# Patient Record
Sex: FEMALE | Race: WHITE | NOT HISPANIC OR LATINO
[De-identification: names, ages, dates, MRNs, and addresses within clinical notes are randomized per-mention and may not be internally consistent; named-entity substitution may affect disease eponyms.]

---

## 2021-03-16 PROBLEM — Z00.00 ENCOUNTER FOR PREVENTIVE HEALTH EXAMINATION: Status: ACTIVE | Noted: 2021-03-16

## 2021-03-24 DIAGNOSIS — Z86.59 PERSONAL HISTORY OF OTHER MENTAL AND BEHAVIORAL DISORDERS: ICD-10-CM

## 2021-03-24 DIAGNOSIS — N61.1 ABSCESS OF THE BREAST AND NIPPLE: ICD-10-CM

## 2021-03-24 DIAGNOSIS — Z86.39 PERSONAL HISTORY OF OTHER ENDOCRINE, NUTRITIONAL AND METABOLIC DISEASE: ICD-10-CM

## 2021-03-24 DIAGNOSIS — Z78.9 OTHER SPECIFIED HEALTH STATUS: ICD-10-CM

## 2021-03-24 DIAGNOSIS — Z87.19 PERSONAL HISTORY OF OTHER DISEASES OF THE DIGESTIVE SYSTEM: ICD-10-CM

## 2021-03-24 DIAGNOSIS — Z87.891 PERSONAL HISTORY OF NICOTINE DEPENDENCE: ICD-10-CM

## 2021-03-24 DIAGNOSIS — Z87.898 PERSONAL HISTORY OF OTHER SPECIFIED CONDITIONS: ICD-10-CM

## 2021-03-24 RX ORDER — ATORVASTATIN CALCIUM 20 MG/1
20 TABLET, FILM COATED ORAL
Refills: 0 | Status: ACTIVE | COMMUNITY

## 2021-03-24 RX ORDER — LEVOTHYROXINE SODIUM 0.09 MG/1
88 TABLET ORAL
Refills: 0 | Status: ACTIVE | COMMUNITY

## 2021-03-26 ENCOUNTER — APPOINTMENT (OUTPATIENT)
Dept: BREAST CENTER | Facility: CLINIC | Age: 71
End: 2021-03-26

## 2021-03-29 ENCOUNTER — APPOINTMENT (OUTPATIENT)
Dept: BREAST CENTER | Facility: CLINIC | Age: 71
End: 2021-03-29

## 2021-03-31 DIAGNOSIS — Z80.0 FAMILY HISTORY OF MALIGNANT NEOPLASM OF DIGESTIVE ORGANS: ICD-10-CM

## 2021-04-01 ENCOUNTER — APPOINTMENT (OUTPATIENT)
Dept: BREAST CENTER | Facility: CLINIC | Age: 71
End: 2021-04-01

## 2021-04-01 ENCOUNTER — APPOINTMENT (OUTPATIENT)
Dept: BREAST CENTER | Facility: CLINIC | Age: 71
End: 2021-04-01
Payer: MEDICARE

## 2021-04-01 VITALS
HEIGHT: 65 IN | WEIGHT: 150 LBS | BODY MASS INDEX: 24.99 KG/M2 | SYSTOLIC BLOOD PRESSURE: 120 MMHG | DIASTOLIC BLOOD PRESSURE: 71 MMHG

## 2021-04-01 DIAGNOSIS — Z80.3 FAMILY HISTORY OF MALIGNANT NEOPLASM OF BREAST: ICD-10-CM

## 2021-04-01 PROCEDURE — 99213 OFFICE O/P EST LOW 20 MIN: CPT

## 2021-04-01 RX ORDER — ESCITALOPRAM OXALATE 10 MG/1
10 TABLET, FILM COATED ORAL
Refills: 0 | Status: ACTIVE | COMMUNITY

## 2021-04-01 RX ORDER — ESCITALOPRAM OXALATE 10 MG/1
10 TABLET ORAL
Refills: 0 | Status: DISCONTINUED | COMMUNITY
End: 2021-04-01

## 2021-04-01 RX ORDER — ZOLPIDEM TARTRATE 10 MG/1
10 TABLET, FILM COATED ORAL
Refills: 0 | Status: ACTIVE | COMMUNITY

## 2021-04-01 RX ORDER — PANTOPRAZOLE 40 MG/1
40 TABLET, DELAYED RELEASE ORAL
Refills: 0 | Status: DISCONTINUED | COMMUNITY
End: 2021-04-01

## 2021-04-01 NOTE — PHYSICAL EXAM
[No Supraclavicular Adenopathy] : no supraclavicular adenopathy [No Cervical Adenopathy] : no cervical adenopathy [Examined in the supine and seated position] : examined in the supine and seated position [Symmetrical] : symmetrical [No dominant masses] : no dominant masses in right breast  [No dominant masses] : no dominant masses left breast [No Nipple Retraction] : no left nipple retraction [No Nipple Discharge] : no left nipple discharge [No Rashes] : no rashes [No Axillary Lymphadenopathy] : no left axillary lymphadenopathy

## 2021-04-01 NOTE — HISTORY OF PRESENT ILLNESS
[FreeTextEntry1] : 70-year-old postmenopausal woman with a strong family history of breast cancer presents for routine follow-up as a high risk patient.  Patient denies any breast masses or nipple discharge.  Patient's mother had breast cancer at age 58 and her sister was diagnosed at age 53.  Dafne model shows a 5.1% 5-year and 13.7% lifetime risk of breast cancer.  Patient has never had a breast biopsy before and is never taken hormone replacement therapy.  Patient has osteopenia and is currently scheduled for a bone density test in June.  She is also due for her mammogram and ultrasound in June.  Many years ago she had genetic testing and was BRCA negative.

## 2021-04-15 ENCOUNTER — TRANSCRIPTION ENCOUNTER (OUTPATIENT)
Age: 71
End: 2021-04-15

## 2022-04-08 NOTE — ASSESSMENT
[FreeTextEntry1] : The patient is a 71-year-old G1, P1 postmenopausal white female.  She underwent menopause in  at age 49 and never took any hormone replacement therapy.  She has never had a breast biopsy before and is never taken hormone replacement therapy.  She has a strong family history of breast cancer with her mother who developed breast cancer at age 58 and later  of metastatic disease.  Her sister was diagnosed with breast cancer and developed metastatic disease to the liver.  She has a maternal aunt with stomach cancer and another great aunt who had breast cancer at a young age.  The patient did undergo genetic testing in the past and was BRCA negative.  Dafne model shows a 5.1% 5-year and 13.7% lifetime risk of breast cancer. She has osteopenia.  The patient underwent her last bilateral mammography and ultrasound which was reviewed from [facility] performed on 2021 which showed no suspicious findings.  On exam today, ...... the patient was reassured and should continue yearly follow-up again in 2023.  Her next bilateral mammography and ultrasound will be due in 2022 and she was given prescriptions. ?????  Did she ever get genetic panel testing ??????

## 2022-04-08 NOTE — PHYSICAL EXAM
[No Supraclavicular Adenopathy] : no supraclavicular adenopathy [No Cervical Adenopathy] : no cervical adenopathy [Examined in the supine and seated position] : examined in the supine and seated position [Symmetrical] : symmetrical [No dominant masses] : no dominant masses in right breast  [No dominant masses] : no dominant masses left breast [No Nipple Retraction] : no left nipple retraction [No Nipple Discharge] : no left nipple discharge [No Axillary Lymphadenopathy] : no left axillary lymphadenopathy [No Rashes] : no rashes [Normocephalic] : normocephalic [Atraumatic] : atraumatic [EOMI] : extra ocular movement intact [Supple] : supple [Breast Mass Right Breast ___cm] : no masses [Breast Mass Left Breast ___cm] : no masses [Breast Nipple Inversion] : nipples not inverted [Breast Nipple Retraction] : nipples not retracted [Breast Nipple Flattening] : nipples not flattened [Breast Nipple Fissures] : nipples not fissured [Breast Abnormal Lactation (Galactorrhea)] : no galactorrhea [Breast Abnormal Secretion Bloody Fluid] : no bloody discharge [Breast Abnormal Secretion Serous Fluid] : no serous discharge [Breast Abnormal Secretion Opalescent Fluid] : no milky discharge [No Edema] : no edema [No Ulceration] : no ulceration [de-identified] : On exam, the patient has ptotic C-cup breasts.  On palpation, I cannot feel any suspicious densities in either breast.  She has no axillary, supraclavicular, or cervical adenopathy.

## 2022-04-08 NOTE — PAST MEDICAL HISTORY
[Postmenopausal] : The patient is postmenopausal [Menarche Age ____] : age at menarche was [unfilled] [Total Preg ___] : G[unfilled] [Live Births ___] : P[unfilled]  [Age At Live Birth ___] : Age at live birth: [unfilled] [Menopause Age____] : age at menopause was [unfilled] [History of Hormone Replacement Treatment] : has no history of hormone replacement treatment

## 2022-04-08 NOTE — HISTORY OF PRESENT ILLNESS
[FreeTextEntry1] : The patient is a 71-year-old G1, P1 postmenopausal white female.  She underwent menopause in  at age 49 and never took any hormone replacement therapy.  She has never had a breast biopsy before and is never taken hormone replacement therapy.  She has a strong family history of breast cancer with her mother who developed breast cancer at age 58 and later  of metastatic disease.  Her sister was diagnosed with breast cancer and developed metastatic disease to the liver.  She has a maternal aunt with stomach cancer and another great aunt who had breast cancer at a young age.  The patient did undergo genetic testing in the past and was BRCA negative.  Dafne model shows a 5.1% 5-year and 13.7% lifetime risk of breast cancer. She has osteopenia.  She comes in for routine follow-up and continues to get yearly mammography and ultrasound.

## 2022-04-08 NOTE — REASON FOR VISIT
[Follow-Up: _____] : a [unfilled] follow-up visit [FreeTextEntry1] : The patient comes in with a strong family history of breast cancer for yearly clinical exam and gets yearly mammography and ultrasound.

## 2022-04-11 ENCOUNTER — APPOINTMENT (OUTPATIENT)
Dept: BREAST CENTER | Facility: CLINIC | Age: 72
End: 2022-04-11

## 2022-04-13 ENCOUNTER — APPOINTMENT (OUTPATIENT)
Dept: BREAST CENTER | Facility: CLINIC | Age: 72
End: 2022-04-13
Payer: MEDICARE

## 2022-04-26 ENCOUNTER — APPOINTMENT (OUTPATIENT)
Dept: BREAST CENTER | Facility: CLINIC | Age: 72
End: 2022-04-26
Payer: MEDICARE

## 2022-04-26 VITALS
WEIGHT: 150 LBS | HEART RATE: 53 BPM | BODY MASS INDEX: 24.96 KG/M2 | SYSTOLIC BLOOD PRESSURE: 124 MMHG | DIASTOLIC BLOOD PRESSURE: 69 MMHG | OXYGEN SATURATION: 98 %

## 2022-04-26 PROCEDURE — 99213 OFFICE O/P EST LOW 20 MIN: CPT

## 2022-04-26 NOTE — ASSESSMENT
[FreeTextEntry1] : The patient is a 71-year-old G1, P1 postmenopausal white female.  She underwent menopause in  at age 49 and never took any hormone replacement therapy.  She has never had a breast biopsy before and is never taken hormone replacement therapy.  She has a strong family history of breast cancer with her mother who developed breast cancer at age 58 and later  of metastatic disease.  Her sister was diagnosed with breast cancer and developed metastatic disease to the liver.  She has a maternal aunt with stomach cancer and another great aunt who had breast cancer at a young age.  The patient did undergo genetic testing in the past and was BRCA negative.  Dafne model shows a 5.1% 5-year and 13.7% lifetime risk of breast cancer. She has osteopenia.  The patient underwent her last bilateral mammography and ultrasound which was reviewed from [facility] performed on  2021 which showed no suspicious findings.  On exam today, I cannot feel any suspicious findings in either breast.  The patient was reassured and should continue yearly follow-up again in 2023.  Her next bilateral mammography and ultrasound will be due in 2022 and she was given prescriptions.  She does not want to move forward with genetic panel testing at this time.

## 2022-04-26 NOTE — PHYSICAL EXAM
[Normocephalic] : normocephalic [Atraumatic] : atraumatic [EOMI] : extra ocular movement intact [Supple] : supple [No Supraclavicular Adenopathy] : no supraclavicular adenopathy [No Cervical Adenopathy] : no cervical adenopathy [Symmetrical] : symmetrical [Examined in the supine and seated position] : examined in the supine and seated position [No dominant masses] : no dominant masses in right breast  [No dominant masses] : no dominant masses left breast [No Nipple Retraction] : no left nipple retraction [No Nipple Discharge] : no left nipple discharge [Breast Mass Right Breast ___cm] : no masses [Breast Mass Left Breast ___cm] : no masses [No Axillary Lymphadenopathy] : no left axillary lymphadenopathy [No Edema] : no edema [No Rashes] : no rashes [No Ulceration] : no ulceration [Breast Nipple Inversion] : nipples not inverted [Breast Nipple Retraction] : nipples not retracted [Breast Nipple Flattening] : nipples not flattened [Breast Nipple Fissures] : nipples not fissured [Breast Abnormal Lactation (Galactorrhea)] : no galactorrhea [Breast Abnormal Secretion Bloody Fluid] : no bloody discharge [Breast Abnormal Secretion Serous Fluid] : no serous discharge [Breast Abnormal Secretion Opalescent Fluid] : no milky discharge [de-identified] : On exam, the patient has ptotic C-cup breasts.  On palpation, I cannot feel any suspicious densities in either breast.  She has no axillary, supraclavicular, or cervical adenopathy.

## 2022-07-14 ENCOUNTER — RESULT REVIEW (OUTPATIENT)
Age: 72
End: 2022-07-14

## 2023-08-17 ENCOUNTER — APPOINTMENT (OUTPATIENT)
Dept: BREAST CENTER | Facility: CLINIC | Age: 73
End: 2023-08-17
Payer: MEDICARE

## 2023-08-17 VITALS
HEIGHT: 65 IN | OXYGEN SATURATION: 97 % | BODY MASS INDEX: 24.99 KG/M2 | WEIGHT: 150 LBS | HEART RATE: 54 BPM | DIASTOLIC BLOOD PRESSURE: 70 MMHG | SYSTOLIC BLOOD PRESSURE: 112 MMHG

## 2023-08-17 DIAGNOSIS — Z80.3 FAMILY HISTORY OF MALIGNANT NEOPLASM OF BREAST: ICD-10-CM

## 2023-08-17 DIAGNOSIS — N60.11 DIFFUSE CYSTIC MASTOPATHY OF LEFT BREAST: ICD-10-CM

## 2023-08-17 DIAGNOSIS — N60.12 DIFFUSE CYSTIC MASTOPATHY OF LEFT BREAST: ICD-10-CM

## 2023-08-17 DIAGNOSIS — R92.2 INCONCLUSIVE MAMMOGRAM: ICD-10-CM

## 2023-08-17 DIAGNOSIS — Z12.39 ENCOUNTER FOR OTHER SCREENING FOR MALIGNANT NEOPLASM OF BREAST: ICD-10-CM

## 2023-08-17 PROCEDURE — 99213 OFFICE O/P EST LOW 20 MIN: CPT

## 2023-08-17 NOTE — PHYSICAL EXAM
[Normocephalic] : normocephalic [Atraumatic] : atraumatic [EOMI] : extra ocular movement intact [Supple] : supple [No Supraclavicular Adenopathy] : no supraclavicular adenopathy [No Cervical Adenopathy] : no cervical adenopathy [Examined in the supine and seated position] : examined in the supine and seated position [Symmetrical] : symmetrical [No dominant masses] : no dominant masses in right breast  [No dominant masses] : no dominant masses left breast [No Nipple Retraction] : no left nipple retraction [No Nipple Discharge] : no left nipple discharge [Breast Mass Right Breast ___cm] : no masses [Breast Mass Left Breast ___cm] : no masses [No Axillary Lymphadenopathy] : no left axillary lymphadenopathy [No Edema] : no edema [No Rashes] : no rashes [No Ulceration] : no ulceration [Breast Nipple Inversion] : nipples not inverted [Breast Nipple Retraction] : nipples not retracted [Breast Nipple Flattening] : nipples not flattened [Breast Nipple Fissures] : nipples not fissured [Breast Abnormal Lactation (Galactorrhea)] : no galactorrhea [Breast Abnormal Secretion Bloody Fluid] : no bloody discharge [Breast Abnormal Secretion Serous Fluid] : no serous discharge [Breast Abnormal Secretion Opalescent Fluid] : no milky discharge [de-identified] : On exam, the patient has ptotic C-cup breasts.  On palpation, I cannot feel any suspicious densities in either breast.  She has no axillary, supraclavicular, or cervical adenopathy.

## 2023-08-17 NOTE — ASSESSMENT
[FreeTextEntry1] : The patient is a 72-year-old G1, P1 postmenopausal white female.  She underwent menopause in  at age 49 and never took any hormone replacement therapy.  She has never had a breast biopsy before and has never taken hormone replacement therapy.  She has a strong family history of breast cancer with her mother who developed breast cancer at age 58 and later  of metastatic disease.  Her sister was diagnosed with breast cancer and developed metastatic disease to the liver.  She has a maternal aunt with stomach cancer and another great aunt who had breast cancer at a young age.  The patient did undergo genetic testing in the past and was BRCA negative.  Dafne model shows a 5.1% 5-year and 13.7% lifetime risk of breast cancer. She has osteopenia.  The patient underwent her last bilateral mammography and ultrasound which was reviewed from [facility] performed on July 15, 2022 which showed no suspicious findings.  On exam today, I cannot feel any suspicious findings in either breast.  The patient was reassured and should continue yearly follow-up again in 2024.  Her next bilateral mammography and ultrasound will be due now and she was given prescriptions.  She does not want to move forward with genetic panel testing at this time.

## 2023-08-17 NOTE — HISTORY OF PRESENT ILLNESS
[FreeTextEntry1] : The patient is a 72-year-old G1, P1 postmenopausal white female.  She underwent menopause in  at age 49 and never took any hormone replacement therapy.  She has never had a breast biopsy before and is never taken hormone replacement therapy.  She has a strong family history of breast cancer with her mother who developed breast cancer at age 58 and later  of metastatic disease.  Her sister was diagnosed with breast cancer and developed metastatic disease to the liver.  She has a maternal aunt with stomach cancer and another great aunt who had breast cancer at a young age.  The patient did undergo genetic testing in the past and was BRCA negative.  Dafne model shows a 5.1% 5-year and 13.7% lifetime risk of breast cancer. She has osteopenia.  She comes in for routine follow-up and continues to get yearly mammography and ultrasound.

## 2023-08-29 ENCOUNTER — RESULT REVIEW (OUTPATIENT)
Age: 73
End: 2023-08-29

## 2024-08-28 ENCOUNTER — NON-APPOINTMENT (OUTPATIENT)
Age: 74
End: 2024-08-28

## 2024-08-28 NOTE — PHYSICAL EXAM
[Normocephalic] : normocephalic [Atraumatic] : atraumatic [EOMI] : extra ocular movement intact [Supple] : supple [No Supraclavicular Adenopathy] : no supraclavicular adenopathy [No Cervical Adenopathy] : no cervical adenopathy [Examined in the supine and seated position] : examined in the supine and seated position [Symmetrical] : symmetrical [No dominant masses] : no dominant masses in right breast  [No dominant masses] : no dominant masses left breast [No Nipple Retraction] : no left nipple retraction [No Nipple Discharge] : no left nipple discharge [Breast Mass Right Breast ___cm] : no masses [Breast Mass Left Breast ___cm] : no masses [Breast Nipple Inversion] : nipples not inverted [Breast Nipple Retraction] : nipples not retracted [Breast Nipple Flattening] : nipples not flattened [Breast Nipple Fissures] : nipples not fissured [Breast Abnormal Lactation (Galactorrhea)] : no galactorrhea [Breast Abnormal Secretion Bloody Fluid] : no bloody discharge [Breast Abnormal Secretion Serous Fluid] : no serous discharge [Breast Abnormal Secretion Opalescent Fluid] : no milky discharge [No Axillary Lymphadenopathy] : no left axillary lymphadenopathy [No Edema] : no edema [No Rashes] : no rashes [No Ulceration] : no ulceration [de-identified] : On exam, the patient has ptotic C-cup breasts.  On palpation, I cannot feel any suspicious densities in either breast.  She has no axillary, supraclavicular, or cervical adenopathy.

## 2024-08-28 NOTE — HISTORY OF PRESENT ILLNESS
[FreeTextEntry1] : The patient is a 74-year-old G1, P1 postmenopausal white female.  She underwent menopause in  at age 49 and never took any hormone replacement therapy.  She has never had a breast biopsy before and is never taken hormone replacement therapy.  She has a strong family history of breast cancer with her mother who developed breast cancer at age 58 and later  of metastatic disease.  Her sister was diagnosed with breast cancer and developed metastatic disease to the liver.  She has a maternal aunt with stomach cancer and another great aunt who had breast cancer at a young age.  The patient did undergo genetic testing in the past and was BRCA negative.  Dafne model shows a 5.1% 5-year and 13.7% lifetime risk of breast cancer. She has osteopenia.  She comes in for routine follow-up and continues to get yearly mammography and ultrasound.

## 2024-08-28 NOTE — ASSESSMENT
[FreeTextEntry1] : The patient is a 74-year-old G1, P1 postmenopausal white female.  She underwent menopause in  at age 49 and never took any hormone replacement therapy.  She has never had a breast biopsy before and has never taken hormone replacement therapy.  She has a strong family history of breast cancer with her mother who developed breast cancer at age 58 and later  of metastatic disease.  Her sister was diagnosed with breast cancer and developed metastatic disease to the liver.  She has a maternal aunt with stomach cancer and another great aunt who had breast cancer at a young age.  The patient did undergo genetic testing in the past and was BRCA negative.  Dafne model shows a 5.1% 5-year and 13.7% lifetime risk of breast cancer. She has osteopenia.  The patient underwent her last bilateral mammography which was reviewed from [facility] performed on ?????? 2023 which showed no suspicious findings.  On exam today, I cannot feel any suspicious findings in either breast.  The patient was reassured and should continue yearly follow-up again in 2025.  Her next bilateral mammography and ultrasound will be due in ?????????? now and she was given prescriptions.  She does not want to move forward with genetic panel testing at this time.

## 2024-09-19 ENCOUNTER — RESULT REVIEW (OUTPATIENT)
Age: 74
End: 2024-09-19

## 2024-09-19 ENCOUNTER — APPOINTMENT (OUTPATIENT)
Dept: BREAST CENTER | Facility: CLINIC | Age: 74
End: 2024-09-19
Payer: MEDICARE

## 2024-09-19 DIAGNOSIS — N60.12 DIFFUSE CYSTIC MASTOPATHY OF LEFT BREAST: ICD-10-CM

## 2024-09-19 DIAGNOSIS — N60.11 DIFFUSE CYSTIC MASTOPATHY OF LEFT BREAST: ICD-10-CM

## 2024-09-19 DIAGNOSIS — Z80.3 FAMILY HISTORY OF MALIGNANT NEOPLASM OF BREAST: ICD-10-CM

## 2024-09-19 DIAGNOSIS — Z12.39 ENCOUNTER FOR OTHER SCREENING FOR MALIGNANT NEOPLASM OF BREAST: ICD-10-CM

## 2024-09-19 DIAGNOSIS — R92.30 DENSE BREASTS, UNSPECIFIED: ICD-10-CM

## 2024-09-19 PROCEDURE — 99213 OFFICE O/P EST LOW 20 MIN: CPT

## 2024-09-19 NOTE — PHYSICAL EXAM
[Normocephalic] : normocephalic [Atraumatic] : atraumatic [EOMI] : extra ocular movement intact [Supple] : supple [No Supraclavicular Adenopathy] : no supraclavicular adenopathy [No Cervical Adenopathy] : no cervical adenopathy [Examined in the supine and seated position] : examined in the supine and seated position [Symmetrical] : symmetrical [No dominant masses] : no dominant masses in right breast  [No dominant masses] : no dominant masses left breast [No Nipple Retraction] : no left nipple retraction [No Nipple Discharge] : no left nipple discharge [Breast Mass Right Breast ___cm] : no masses [Breast Mass Left Breast ___cm] : no masses [No Axillary Lymphadenopathy] : no left axillary lymphadenopathy [No Edema] : no edema [No Rashes] : no rashes [No Ulceration] : no ulceration [Breast Nipple Inversion] : nipples not inverted [Breast Nipple Retraction] : nipples not retracted [Breast Nipple Flattening] : nipples not flattened [Breast Nipple Fissures] : nipples not fissured [Breast Abnormal Lactation (Galactorrhea)] : no galactorrhea [Breast Abnormal Secretion Bloody Fluid] : no bloody discharge [Breast Abnormal Secretion Serous Fluid] : no serous discharge [Breast Abnormal Secretion Opalescent Fluid] : no milky discharge [de-identified] : On exam, the patient has ptotic C-cup breasts.  On palpation, I cannot feel any suspicious densities in either breast.  She has no axillary, supraclavicular, or cervical adenopathy.

## 2024-09-19 NOTE — PHYSICAL EXAM
[Normocephalic] : normocephalic [Atraumatic] : atraumatic [EOMI] : extra ocular movement intact [Supple] : supple [No Supraclavicular Adenopathy] : no supraclavicular adenopathy [No Cervical Adenopathy] : no cervical adenopathy [Examined in the supine and seated position] : examined in the supine and seated position [Symmetrical] : symmetrical [No dominant masses] : no dominant masses in right breast  [No dominant masses] : no dominant masses left breast [No Nipple Retraction] : no left nipple retraction [No Nipple Discharge] : no left nipple discharge [Breast Mass Right Breast ___cm] : no masses [Breast Mass Left Breast ___cm] : no masses [No Axillary Lymphadenopathy] : no left axillary lymphadenopathy [No Edema] : no edema [No Rashes] : no rashes [No Ulceration] : no ulceration [Breast Nipple Inversion] : nipples not inverted [Breast Nipple Retraction] : nipples not retracted [Breast Nipple Flattening] : nipples not flattened [Breast Nipple Fissures] : nipples not fissured [Breast Abnormal Lactation (Galactorrhea)] : no galactorrhea [Breast Abnormal Secretion Bloody Fluid] : no bloody discharge [Breast Abnormal Secretion Serous Fluid] : no serous discharge [Breast Abnormal Secretion Opalescent Fluid] : no milky discharge [de-identified] : On exam, the patient has ptotic C-cup breasts.  On palpation, I cannot feel any suspicious densities in either breast.  She has no axillary, supraclavicular, or cervical adenopathy.

## 2024-09-19 NOTE — ADDENDUM
[FreeTextEntry1] : I spent greater than 75% in consultation in face-to-face counseling and coordination of care in this patient with a strong family history of breast cancer who comes in now for routine breast screening/surveillance.

## 2024-09-19 NOTE — ASSESSMENT
[FreeTextEntry1] : The patient is a 74-year-old G1, P1 postmenopausal white female.  She underwent menopause in  at age 49 and never took any hormone replacement therapy.  She has never had a breast biopsy before and has never taken hormone replacement therapy.  She has a strong family history of breast cancer with her mother who developed breast cancer at age 58 and later  of metastatic disease.  Her sister was diagnosed with breast cancer and developed metastatic disease to the liver.  She has a maternal aunt with stomach cancer and another great aunt who had breast cancer at a young age.  The patient did undergo genetic testing in the past and was BRCA negative.  Dafne model shows a 5.1% 5-year and 13.7% lifetime risk of breast cancer. She has osteopenia.  The patient underwent her last bilateral mammography which was reviewed from [facility] performed on 2023 which showed no suspicious findings.  On exam today, I cannot feel any suspicious findings in either breast.  Her next bilateral mammography and ultrasound is scheduled for tomorrow on 2024 and if that is negative, she should follow-up again in 1 year in 2025 and will be due for her next bilateral mammography and ultrasound again at that time. She does not want to move forward with genetic panel testing at this time.

## 2024-09-19 NOTE — ASSESSMENT
[FreeTextEntry1] : The patient is a 74-year-old G1, P1 postmenopausal white female.  She underwent menopause in  at age 49 and never took any hormone replacement therapy.  She has never had a breast biopsy before and has never taken hormone replacement therapy.  She has a strong family history of breast cancer with her mother who developed breast cancer at age 58 and later  of metastatic disease.  Her sister was diagnosed with breast cancer and developed metastatic disease to the liver.  She has a maternal aunt with stomach cancer and another great aunt who had breast cancer at a young age.  The patient did undergo genetic testing in the past and was BRCA negative.  Dafne model shows a 5.1% 5-year and 13.7% lifetime risk of breast cancer. She has osteopenia.  The patient underwent her last bilateral mammography which was reviewed from [facility] performed on 2023 which showed no suspicious findings.  On exam today, I cannot feel any suspicious findings in either breast.  Her next bilateral mammography and ultrasound is scheduled for tomorrow on 2024 and if that is negative, she should follow-up again in 1 year in 2025 and will be due for her next bilateral mammography and ultrasound again at that time. She does not want to move forward with genetic panel testing at this time.